# Patient Record
Sex: FEMALE | Race: BLACK OR AFRICAN AMERICAN | Employment: FULL TIME | URBAN - METROPOLITAN AREA
[De-identification: names, ages, dates, MRNs, and addresses within clinical notes are randomized per-mention and may not be internally consistent; named-entity substitution may affect disease eponyms.]

---

## 2017-10-28 ENCOUNTER — HOSPITAL ENCOUNTER (EMERGENCY)
Age: 32
Discharge: HOME | End: 2017-10-28
Payer: SELF-PAY

## 2017-10-28 VITALS
HEART RATE: 78 BPM | BODY MASS INDEX: 50.02 KG/M2 | DIASTOLIC BLOOD PRESSURE: 55 MMHG | SYSTOLIC BLOOD PRESSURE: 116 MMHG | TEMPERATURE: 97.3 F | WEIGHT: 293 LBS | OXYGEN SATURATION: 99 % | HEIGHT: 64 IN | RESPIRATION RATE: 18 BRPM

## 2017-10-28 DIAGNOSIS — K04.7 DENTAL ABSCESS: Primary | ICD-10-CM

## 2017-10-28 PROCEDURE — 74011250636 HC RX REV CODE- 250/636

## 2017-10-28 PROCEDURE — 74011250637 HC RX REV CODE- 250/637

## 2017-10-28 PROCEDURE — 99283 EMERGENCY DEPT VISIT LOW MDM: CPT

## 2017-10-28 PROCEDURE — 96372 THER/PROPH/DIAG INJ SC/IM: CPT

## 2017-10-28 RX ORDER — PROMETHAZINE HYDROCHLORIDE 25 MG/1
25 TABLET ORAL
Status: COMPLETED | OUTPATIENT
Start: 2017-10-28 | End: 2017-10-28

## 2017-10-28 RX ORDER — KETOROLAC TROMETHAMINE 30 MG/ML
60 INJECTION, SOLUTION INTRAMUSCULAR; INTRAVENOUS
Status: COMPLETED | OUTPATIENT
Start: 2017-10-28 | End: 2017-10-28

## 2017-10-28 RX ORDER — HYDROCODONE BITARTRATE AND ACETAMINOPHEN 7.5; 325 MG/1; MG/1
1 TABLET ORAL
Status: COMPLETED | OUTPATIENT
Start: 2017-10-28 | End: 2017-10-28

## 2017-10-28 RX ORDER — TRAMADOL HYDROCHLORIDE 50 MG/1
50 TABLET ORAL
Qty: 12 TAB | Refills: 0 | Status: SHIPPED | OUTPATIENT
Start: 2017-10-28

## 2017-10-28 RX ORDER — CLINDAMYCIN HYDROCHLORIDE 300 MG/1
300 CAPSULE ORAL 4 TIMES DAILY
Qty: 28 CAP | Refills: 0 | Status: SHIPPED | OUTPATIENT
Start: 2017-10-28 | End: 2017-11-04

## 2017-10-28 RX ADMIN — KETOROLAC TROMETHAMINE 60 MG: 30 INJECTION, SOLUTION INTRAMUSCULAR at 03:18

## 2017-10-28 RX ADMIN — PROMETHAZINE HYDROCHLORIDE 25 MG: 25 TABLET ORAL at 03:18

## 2017-10-28 RX ADMIN — HYDROCODONE BITARTRATE AND ACETAMINOPHEN 1 TABLET: 7.5; 325 TABLET ORAL at 03:18

## 2017-10-28 NOTE — ED PROVIDER NOTES
HPI Comments: 80-year-old female complaining of swelling to her right cheek area. Patient has a broken tooth occurred 2 days ago swelling started tonight. Patient is a 28 y.o. female presenting with facial pain. The history is provided by the patient. Facial Pain    The incident occurred 6 to 12 hours ago. The pain is at a severity of 7/10. The pain is moderate. The pain has been constant since the injury. Pertinent negatives include no disorientation. She has tried nothing for the symptoms. No past medical history on file. Past Surgical History:   Procedure Laterality Date    HX HEENT      tubes in ear         No family history on file. Social History     Social History    Marital status: SINGLE     Spouse name: N/A    Number of children: N/A    Years of education: N/A     Occupational History    Not on file. Social History Main Topics    Smoking status: Never Smoker    Smokeless tobacco: Not on file    Alcohol use No    Drug use: No    Sexual activity: Yes     Partners: Male     Birth control/ protection: IUD     Other Topics Concern    Not on file     Social History Narrative         ALLERGIES: Amoxicillin    Review of Systems   Constitutional: Negative. Negative for activity change. HENT: Negative. Eyes: Negative. Respiratory: Negative. Cardiovascular: Negative. Gastrointestinal: Negative. Genitourinary: Negative. Musculoskeletal: Negative. Skin: Negative. Neurological: Negative. Psychiatric/Behavioral: Negative. All other systems reviewed and are negative. Vitals:    10/28/17 0258 10/28/17 0338   BP:  116/55   Pulse: 65 78   Resp: 18 18   Temp: 98.2 °F (36.8 °C) 97.3 °F (36.3 °C)   SpO2: 99% 99%   Weight: 134.3 kg (296 lb)    Height: 5' 4\" (1.626 m)             Physical Exam   Constitutional: She is oriented to person, place, and time. She appears well-developed and well-nourished. No distress. HENT:   Head: Normocephalic and atraumatic. Right Ear: External ear normal.   Left Ear: External ear normal.   Nose: Nose normal.   Mouth/Throat: Oropharynx is clear and moist. Abnormal dentition. Dental abscesses and dental caries present. No oropharyngeal exudate. Eyes: Conjunctivae and EOM are normal. Pupils are equal, round, and reactive to light. Right eye exhibits no discharge. Left eye exhibits no discharge. No scleral icterus. Neck: Normal range of motion. Neck supple. No JVD present. No tracheal deviation present. Cardiovascular: Normal rate, regular rhythm and intact distal pulses. Pulmonary/Chest: Effort normal and breath sounds normal. No stridor. No respiratory distress. She has no wheezes. She exhibits no tenderness. Abdominal: Soft. Bowel sounds are normal. She exhibits no distension and no mass. There is no tenderness. Musculoskeletal: Normal range of motion. She exhibits no edema or tenderness. Neurological: She is alert and oriented to person, place, and time. No cranial nerve deficit. Skin: Skin is warm and dry. No rash noted. She is not diaphoretic. No erythema. No pallor. Psychiatric: She has a normal mood and affect. Her behavior is normal. Thought content normal.   Nursing note and vitals reviewed. MDM  Number of Diagnoses or Management Options  Dental abscess: minor  Diagnosis management comments: Dental abscess and fractured teeth.   Patient encouraged to follow dentist as soon as possible should be given pain medicine and antibiotics for the weekend until she can get in to see a dentist.    ED Course       Procedures

## 2017-10-28 NOTE — ED NOTES
I have reviewed discharge instructions with the patient. The patient verbalized understanding. Patient left ED via Discharge Method: ambulatory to Home with (spouse  Opportunity for questions and clarification provided. Patient given 2 scripts.

## 2017-10-28 NOTE — DISCHARGE INSTRUCTIONS
Abscessed Tooth: Care Instructions  Your Care Instructions    An abscessed tooth is a tooth that has a pocket of pus in the tissues around it. Pus forms when the body tries to fight an infection caused by bacteria. If the pus cannot drain, it forms an abscess. An abscessed tooth can cause red, swollen gums and throbbing pain, especially when you chew. You may have a bad taste in your mouth and a fever, and your jaw may swell. Damage to the tooth, untreated tooth decay, or gum disease can cause an abscessed tooth. An abscessed tooth needs to be treated by a dental professional right away. If it is not treated, the infection could spread to other parts of your body. Your dentist will give you antibiotics to stop the infection. He or she may make a hole in the tooth or cut open (brayan) the abscess inside your mouth so that the infection can drain, which should relieve your pain. You may need to have a root canal treatment, which tries to save your tooth by taking out the infected pulp and replacing it with a healing medicine and/or a filling. If these treatments do not work, your tooth may have to be removed. Follow-up care is a key part of your treatment and safety. Be sure to make and go to all appointments, and call your doctor if you are having problems. It's also a good idea to know your test results and keep a list of the medicines you take. How can you care for yourself at home? · Reduce pain and swelling in your face and jaw by putting ice or a cold pack on the outside of your cheek for 10 to 20 minutes at a time. Put a thin cloth between the ice and your skin. · Take pain medicines exactly as directed. ¨ If the doctor gave you a prescription medicine for pain, take it as prescribed. ¨ If you are not taking a prescription pain medicine, ask your doctor if you can take an over-the-counter medicine. · Take your antibiotics as directed. Do not stop taking them just because you feel better.  You need to take the full course of antibiotics. To prevent tooth abscess  · Brush and floss every day, and have regular dental checkups. · Eat a healthy diet, and avoid sugary foods and drinks. · Do not smoke, use e-cigarettes with nicotine, or use spit tobacco. Tobacco and nicotine slow your ability to heal. Tobacco also increases your risk for gum disease and cancer of the mouth and throat. If you need help quitting, talk to your doctor about stop-smoking programs and medicines. These can increase your chances of quitting for good. When should you call for help? Call 911 anytime you think you may need emergency care. For example, call if:  ? · You have trouble breathing. ?Call your doctor now or seek immediate medical care if:  ? · You have new or worse symptoms of infection, such as:  ¨ Increased pain, swelling, warmth, or redness. ¨ Red streaks leading from the area. ¨ Pus draining from the area. ¨ A fever. ? Watch closely for changes in your health, and be sure to contact your doctor if:  ? · You do not get better as expected. Where can you learn more? Go to http://kellee-jeny.info/. Enter F184 in the search box to learn more about \"Abscessed Tooth: Care Instructions. \"  Current as of: May 12, 2017  Content Version: 11.4  © 0926-2565 [employer]. Care instructions adapted under license by [employer] (which disclaims liability or warranty for this information). If you have questions about a medical condition or this instruction, always ask your healthcare professional. [place] [address] any warranty or liability for your use of this information.

## 2023-03-25 ENCOUNTER — HOSPITAL ENCOUNTER (EMERGENCY)
Age: 38
Discharge: HOME | End: 2023-03-25
Attending: STUDENT IN AN ORGANIZED HEALTH CARE EDUCATION/TRAINING PROGRAM

## 2023-03-25 VITALS
SYSTOLIC BLOOD PRESSURE: 136 MMHG | OXYGEN SATURATION: 99 % | RESPIRATION RATE: 18 BRPM | BODY MASS INDEX: 55.32 KG/M2 | HEART RATE: 80 BPM | TEMPERATURE: 99 F | WEIGHT: 293 LBS | DIASTOLIC BLOOD PRESSURE: 75 MMHG | HEIGHT: 61 IN

## 2023-03-25 DIAGNOSIS — Z77.098 CHEMICAL EXPOSURE: Primary | ICD-10-CM

## 2023-03-25 PROCEDURE — 99282 EMERGENCY DEPT VISIT SF MDM: CPT | Performed by: STUDENT IN AN ORGANIZED HEALTH CARE EDUCATION/TRAINING PROGRAM

## 2023-03-25 ASSESSMENT — LIFESTYLE VARIABLES
HOW MANY STANDARD DRINKS CONTAINING ALCOHOL DO YOU HAVE ON A TYPICAL DAY: 1 OR 2
HOW OFTEN DO YOU HAVE A DRINK CONTAINING ALCOHOL: MONTHLY OR LESS

## 2023-03-26 NOTE — ED TRIAGE NOTES
Pt reports exposure to unknown substance on right hand; local swelling to the hand; rinsed with Em, Dial, and peroxide with no relief

## 2023-03-26 NOTE — ED PROVIDER NOTES
[address]  Emergency Department    ED Course     ED Course as of 03/25/23 2241   Sat Mar 25, 2023   259 69-year-old female no past medical history presents with cute onset of right hand burning sensation after touching paint and other unknown chemicals while moving earlier today. No sensation or motor function deficits on right hand. No appreciable swelling, rash, other defects. Does have superficial what appears to be oil-based paint in the area. Suspect likely chemical reaction exacerbated by underlying anxiety worsened by recent stressors. She does feel better after irrigation. Stable for discharge home. Return given [ER]      ED Course User Index  [ER] Melquiades Vaughn MD     Complexity of Problems Addressed:  1 or more stable acute illness or injury    Data Reviewed and Analyzed:  Category 1:   I ordered each unique test.  I reviewed the results of each unique test.      Category 2:     Category 3: Discussion of management or test interpretation. See ED Course above    HPI   Doretha Mcghee is a 45 y.o. female with a history of none who presents to the ED with complaint of hand pain. Patient states that she has been moving into a new home all day today. This evening while sitting on the couch she had a cute onset of pain to her right hand. Noted a white stain/spots on the palmar aspect of her right hand between her first and second digits. She believes she had touched some paint from moving furniture earlier in the day. Noticed some itching in the webspace with some swelling of her second digit. No rash, erythema. Symptoms not migrate upper arm. She did irrigate and clean the area with soap and water. This did help her symptoms somewhat. Symptoms have improved since coming to the ER. States she felt very anxious about her symptoms after being stressed out about her move and this caused her symptoms to be worse.   Denies rash, tongue/throat swelling, shortness of breath, nausea, vomiting, fevers. No puncture wounds to right hand. Denies numbness, tingling, weakness    ROS   Review of Systems   Skin:  Positive for wound. All other systems reviewed and are negative. History   History reviewed. No pertinent past medical history. History reviewed. No pertinent surgical history. History reviewed. No pertinent family history. Allergies   Allergen Reactions    Latex Swelling       Physical Exam     Vitals:    03/25/23 2157   BP: (!) 158/94   Pulse: 84   Resp: 19   Temp: 99 °F (37.2 °C)   TempSrc: Oral   SpO2: 99%   Weight: 300 lb (136.1 kg)   Height: 5' 1\" (1.549 m)     Nursing note and vitals reviewed. Constitutional: Well developed, NAD  HEENT: Atraumatic, conjugate gaze, EOM intact  Neck: Supple  Cardiovascular: No cyanosis, diaphoresis, or JVD appreciated. Respiratory: Effort normal. No respiratory distress. Gastrointestinal: Bowel sounds present. Non-distended. MSK: No deformities appreciated. No peripheral edema. Skin: Skin is warm and dry. Scattered, superficial white spots consistent with likely oil-based paint on palmar aspect between first and second webspace. Spots do seem to somewhat improved with vigorous rubbing with abrasive 4 x 4. No rash, swelling, sensation deficit, puncture wounds. Radial, ulnar, median nerves intact. 1+ radial pulse  Neuro: Alert and oriented, moves all four extremities. Psych: Pleasant and cooperative. Procedures   Procedures    MDM   Labs Reviewed - No data to display  Medications - No data to display  No orders to display     Voice dictation software was used during the making of this note. This software is not perfect and grammatical and other typographical errors may be present. This note has not been completely proofread for errors.      Hector He MD  03/25/23 7063

## 2023-03-26 NOTE — ED NOTES
I have reviewed discharge instructions with the patient. The patient verbalized understanding. Patient left ED via Discharge Method: ambulatory to Home with family. Opportunity for questions and clarification provided. Patient given 0 scripts. To continue your aftercare when you leave the hospital, you may receive an automated call from our care team to check in on how you are doing. This is a free service and part of our promise to provide the best care and service to meet your aftercare needs.  If you have questions, or wish to unsubscribe from this service please call [phone]. Thank you for Choosing our [facility] Emergency Department.       Veronica Lemus RN  03/25/23 8454

## 2023-03-26 NOTE — DISCHARGE INSTRUCTIONS
Recommend continuing to irrigate and rinse the area with any further symptoms. You may use a special industrial soap to help with oil-based paints on the hand. Return to the ER for any new or worsening symptoms or if any persistent rash or difficulty breathing.

## 2024-09-08 ENCOUNTER — HOSPITAL ENCOUNTER (EMERGENCY)
Age: 39
Discharge: HOME | End: 2024-09-08

## 2024-09-08 VITALS
RESPIRATION RATE: 20 BRPM | HEART RATE: 80 BPM | SYSTOLIC BLOOD PRESSURE: 130 MMHG | HEIGHT: 61 IN | DIASTOLIC BLOOD PRESSURE: 74 MMHG | TEMPERATURE: 98 F | OXYGEN SATURATION: 100 % | BODY MASS INDEX: 55.32 KG/M2 | WEIGHT: 293 LBS

## 2024-09-08 DIAGNOSIS — J02.9 VIRAL PHARYNGITIS: Primary | ICD-10-CM

## 2024-09-08 LAB
FLUAV RNA SPEC QL NAA+PROBE: NOT DETECTED
FLUBV RNA SPEC QL NAA+PROBE: NOT DETECTED
SARS-COV-2 RDRP RESP QL NAA+PROBE: NOT DETECTED
SOURCE: NORMAL
STREP, MOLECULAR: NOT DETECTED

## 2024-09-08 PROCEDURE — 6360000002 HC RX W HCPCS

## 2024-09-08 PROCEDURE — 87651 STREP A DNA AMP PROBE: CPT

## 2024-09-08 PROCEDURE — 87502 INFLUENZA DNA AMP PROBE: CPT

## 2024-09-08 PROCEDURE — 99283 EMERGENCY DEPT VISIT LOW MDM: CPT

## 2024-09-08 PROCEDURE — 87635 SARS-COV-2 COVID-19 AMP PRB: CPT

## 2024-09-08 RX ORDER — PREDNISONE 50 MG/1
50 TABLET ORAL DAILY
Qty: 5 TABLET | Refills: 0 | Status: SHIPPED | OUTPATIENT
Start: 2024-09-08 | End: 2024-09-13

## 2024-09-08 RX ORDER — DEXAMETHASONE SODIUM PHOSPHATE 10 MG/ML
10 INJECTION INTRAMUSCULAR; INTRAVENOUS ONCE
Status: COMPLETED | OUTPATIENT
Start: 2024-09-08 | End: 2024-09-08

## 2024-09-08 RX ORDER — FLUTICASONE PROPIONATE 50 MCG
1 SPRAY, SUSPENSION (ML) NASAL DAILY
Qty: 32 G | Refills: 1 | Status: SHIPPED | OUTPATIENT
Start: 2024-09-08

## 2024-09-08 RX ORDER — CETIRIZINE HYDROCHLORIDE 10 MG/1
10 TABLET ORAL DAILY
Qty: 30 TABLET | Refills: 0 | Status: SHIPPED | OUTPATIENT
Start: 2024-09-08

## 2024-09-08 RX ADMIN — DEXAMETHASONE SODIUM PHOSPHATE 10 MG: 10 INJECTION INTRAMUSCULAR; INTRAVENOUS at 12:27

## 2024-09-08 ASSESSMENT — PAIN DESCRIPTION - LOCATION: LOCATION: THROAT

## 2024-09-08 ASSESSMENT — PAIN - FUNCTIONAL ASSESSMENT
PAIN_FUNCTIONAL_ASSESSMENT: 0-10
PAIN_FUNCTIONAL_ASSESSMENT: NONE - DENIES PAIN

## 2024-09-08 ASSESSMENT — LIFESTYLE VARIABLES
HOW MANY STANDARD DRINKS CONTAINING ALCOHOL DO YOU HAVE ON A TYPICAL DAY: PATIENT DOES NOT DRINK
HOW OFTEN DO YOU HAVE A DRINK CONTAINING ALCOHOL: NEVER

## 2024-09-08 ASSESSMENT — PAIN DESCRIPTION - DESCRIPTORS: DESCRIPTORS: ACHING

## 2024-09-08 ASSESSMENT — PAIN SCALES - GENERAL: PAINLEVEL_OUTOF10: 7

## 2025-03-28 ENCOUNTER — HOSPITAL ENCOUNTER (EMERGENCY)
Age: 40
Discharge: HOME | End: 2025-03-28

## 2025-03-28 VITALS
BODY MASS INDEX: 55.32 KG/M2 | RESPIRATION RATE: 18 BRPM | OXYGEN SATURATION: 100 % | WEIGHT: 293 LBS | SYSTOLIC BLOOD PRESSURE: 137 MMHG | HEIGHT: 61 IN | HEART RATE: 91 BPM | DIASTOLIC BLOOD PRESSURE: 85 MMHG | TEMPERATURE: 98.4 F

## 2025-03-28 DIAGNOSIS — L02.91 ABSCESS: Primary | ICD-10-CM

## 2025-03-28 PROCEDURE — 41800 DRAINAGE OF GUM LESION: CPT

## 2025-03-28 PROCEDURE — 6370000000 HC RX 637 (ALT 250 FOR IP)

## 2025-03-28 PROCEDURE — 6360000002 HC RX W HCPCS

## 2025-03-28 PROCEDURE — 99283 EMERGENCY DEPT VISIT LOW MDM: CPT

## 2025-03-28 RX ORDER — LIDOCAINE HYDROCHLORIDE AND EPINEPHRINE 10; 10 MG/ML; UG/ML
20 INJECTION, SOLUTION INFILTRATION; PERINEURAL ONCE
Status: COMPLETED | OUTPATIENT
Start: 2025-03-28 | End: 2025-03-28

## 2025-03-28 RX ORDER — LIDOCAINE HYDROCHLORIDE 20 MG/ML
15 SOLUTION OROPHARYNGEAL
Status: COMPLETED | OUTPATIENT
Start: 2025-03-28 | End: 2025-03-28

## 2025-03-28 RX ORDER — LIDOCAINE HYDROCHLORIDE 20 MG/ML
15 SOLUTION OROPHARYNGEAL PRN
Qty: 100 ML | Refills: 0 | Status: SHIPPED | OUTPATIENT
Start: 2025-03-28

## 2025-03-28 RX ADMIN — LIDOCAINE HYDROCHLORIDE 15 ML: 20 SOLUTION ORAL at 21:39

## 2025-03-28 RX ADMIN — LIDOCAINE HYDROCHLORIDE,EPINEPHRINE BITARTRATE 20 ML: 10; .01 INJECTION, SOLUTION INFILTRATION; PERINEURAL at 21:40

## 2025-03-28 ASSESSMENT — LIFESTYLE VARIABLES
HOW OFTEN DO YOU HAVE A DRINK CONTAINING ALCOHOL: NEVER
HOW MANY STANDARD DRINKS CONTAINING ALCOHOL DO YOU HAVE ON A TYPICAL DAY: PATIENT DOES NOT DRINK

## 2025-03-28 ASSESSMENT — PAIN SCALES - GENERAL: PAINLEVEL_OUTOF10: 2

## 2025-03-28 ASSESSMENT — PAIN - FUNCTIONAL ASSESSMENT: PAIN_FUNCTIONAL_ASSESSMENT: NONE - DENIES PAIN

## 2025-03-29 ASSESSMENT — ENCOUNTER SYMPTOMS
STRIDOR: 0
WHEEZING: 0
SHORTNESS OF BREATH: 0
FACIAL SWELLING: 0
TROUBLE SWALLOWING: 0
SORE THROAT: 0
VOICE CHANGE: 0

## 2025-03-29 NOTE — ED NOTES
I have reviewed discharge instructions with the patient.  The patient verbalized understanding.    Patient left ED via Discharge Method: ambulatory to Home.    Opportunity for questions and clarification provided.       Patient given 2 scripts.         To continue your aftercare when you leave the hospital, you may receive an automated call from our care team to check in on how you are doing.  This is a free service and part of our promise to provide the best care and service to meet your aftercare needs.” If you have questions, or wish to unsubscribe from this service please call [phone].  Thank you for Choosing our [facility] Emergency Department.

## 2025-03-29 NOTE — ED TRIAGE NOTES
Pt ambulated to triage    Pt states she has an abscess in her mother on the lower left jaw.  Pt was gargling salt water and noticed blood when she spit.  Pt now feels nauseated, and weak.  Pt is concerned it may be infected.

## 2025-03-29 NOTE — DISCHARGE INSTRUCTIONS
As discussed you have an abscess in your mouth.  Please take the antibiotic as prescribed.  You can continue using the lidocaine.  Please continue using the salt water swishes.  Follow-up with your dentist on Monday.  If your symptoms change or worsen please return to the emergency department.

## 2025-03-29 NOTE — ED PROVIDER NOTES
Emergency Department Provider Note       PCP: No primary care provider on file.   Age: 40 y.o.   Sex: female     DISPOSITION Decision To Discharge 03/28/2025 10:23:07 PM    ICD-10-CM    1. Abscess  L02.91           Medical Decision Making     Patient is a 40-year-old female with no relevant past medical history presenting to the emergency department dental abscess.  1 cm area of fluctuation noted at second bicuspid, there is surrounding gingival erythema and inflammation.  Examination does not correlate with Ludwings angina, there is no tenderness to palpation of floor of mouth, tissue is soft to palpation, no submandibular swelling noted.  Low suspicion for epiglottitis, there is no evidence of respiratory distress on physical exam, denies pharyngitis, O2 sat of 100% on physical exam.  Clinical picture does not correlate with peritonsillar abscess, there is no submandibular swelling.  No visualized tonsillar abscess on physical exam.  Patient is nontoxic in appearance, afebrile with O2 sat 100%.  Will perform I&D of abscess.    Patient tolerated the procedure well.  Area prepped in typical sterile manner.  Area anesthetized with lidocaine.  Stab incision made, purulent drainage expressed.  Gauze placed.  Patient tolerated the procedure well, no immediate complications.  No evidence of respiratory distress postprocedural.  Patient does confirm resolution of pain.  Will place patient on course of Augmentin.  She does have a dentist appointment this upcoming Monday.  Instructed patient to have symptom recheck at that appointment.  Discussed supportive treatment with patient.  She was agreeable to treatment plan.  Strict return precautions provided, she verbalized understanding.     1 acute, uncomplicated illness or injury.  Prescription drug management performed.  Shared medical decision making was utilized in creating the patients health plan today.  I independently ordered and reviewed each unique test.

## 2025-05-29 ENCOUNTER — HOSPITAL ENCOUNTER (EMERGENCY)
Age: 40
Discharge: HOME | End: 2025-05-29
Payer: COMMERCIAL

## 2025-05-29 VITALS
OXYGEN SATURATION: 99 % | RESPIRATION RATE: 22 BRPM | WEIGHT: 290 LBS | DIASTOLIC BLOOD PRESSURE: 95 MMHG | SYSTOLIC BLOOD PRESSURE: 146 MMHG | BODY MASS INDEX: 54.75 KG/M2 | TEMPERATURE: 98.8 F | HEART RATE: 110 BPM | HEIGHT: 61 IN

## 2025-05-29 DIAGNOSIS — J02.0 STREP PHARYNGITIS: Primary | ICD-10-CM

## 2025-05-29 LAB
BACTERIA URNS QL MICRO: NEGATIVE /HPF
BILIRUB UR QL: NEGATIVE
EPI CELLS #/AREA URNS HPF: NORMAL /HPF
FLUAV RNA SPEC QL NAA+PROBE: NOT DETECTED
FLUBV RNA SPEC QL NAA+PROBE: NOT DETECTED
GLUCOSE UR QL STRIP.AUTO: NEGATIVE MG/DL
HYALINE CASTS URNS QL MICRO: NORMAL /LPF
KETONES UR-MCNC: NEGATIVE MG/DL
LEUKOCYTE ESTERASE UR QL STRIP: ABNORMAL
NITRITE UR QL: NEGATIVE
PH UR: 6.5 (ref 5–9)
PROT UR QL: NEGATIVE MG/DL
RBC # UR STRIP: NEGATIVE
RBC #/AREA URNS HPF: NORMAL /HPF
SARS-COV-2 RDRP RESP QL NAA+PROBE: NOT DETECTED
SERVICE CMNT-IMP: ABNORMAL
SOURCE: NORMAL
SP GR UR: 1.01 (ref 1–1.02)
STREP, MOLECULAR: DETECTED
UROBILINOGEN UR QL: 0.2 EU/DL (ref 0.2–1)
WBC URNS QL MICRO: NORMAL /HPF

## 2025-05-29 PROCEDURE — 87651 STREP A DNA AMP PROBE: CPT

## 2025-05-29 PROCEDURE — 6370000000 HC RX 637 (ALT 250 FOR IP)

## 2025-05-29 PROCEDURE — 81001 URINALYSIS AUTO W/SCOPE: CPT

## 2025-05-29 PROCEDURE — 87636 SARSCOV2 & INF A&B AMP PRB: CPT

## 2025-05-29 PROCEDURE — 99283 EMERGENCY DEPT VISIT LOW MDM: CPT

## 2025-05-29 RX ORDER — ONDANSETRON 4 MG/1
4 TABLET, ORALLY DISINTEGRATING ORAL 3 TIMES DAILY PRN
Qty: 21 TABLET | Refills: 0 | Status: SHIPPED | OUTPATIENT
Start: 2025-05-29

## 2025-05-29 RX ORDER — AMOXICILLIN 500 MG/1
500 CAPSULE ORAL 2 TIMES DAILY
Qty: 14 CAPSULE | Refills: 0 | Status: SHIPPED | OUTPATIENT
Start: 2025-05-29 | End: 2025-06-05

## 2025-05-29 RX ORDER — ACETAMINOPHEN 500 MG
1000 TABLET ORAL
Status: COMPLETED | OUTPATIENT
Start: 2025-05-29 | End: 2025-05-29

## 2025-05-29 RX ORDER — ONDANSETRON 4 MG/1
4 TABLET, ORALLY DISINTEGRATING ORAL
Status: COMPLETED | OUTPATIENT
Start: 2025-05-29 | End: 2025-05-29

## 2025-05-29 RX ADMIN — ACETAMINOPHEN 1000 MG: 500 TABLET, FILM COATED ORAL at 18:33

## 2025-05-29 RX ADMIN — ONDANSETRON 4 MG: 4 TABLET, ORALLY DISINTEGRATING ORAL at 18:33

## 2025-05-29 ASSESSMENT — ENCOUNTER SYMPTOMS
RHINORRHEA: 1
COUGH: 1
SORE THROAT: 1
ALLERGIC/IMMUNOLOGIC NEGATIVE: 1
GASTROINTESTINAL NEGATIVE: 1
EYES NEGATIVE: 1
SHORTNESS OF BREATH: 0

## 2025-05-29 ASSESSMENT — PAIN SCALES - GENERAL: PAINLEVEL_OUTOF10: 8

## 2025-05-29 NOTE — DISCHARGE INSTRUCTIONS
As we discussed, your workup does show that you do have strep throat.  I will start you on a course of antibiotics.  I will also give you some Zofran.  Please take Tylenol and/or ibuprofen at home for aches, pains, fevers.  I also given information for follow-up with primary care.  As we discussed, please return to emergency department for any new, worsening, concerning symptoms.

## 2025-05-29 NOTE — ED PROVIDER NOTES
Emergency Department Provider Note       [facility] EMERGENCY DEPT   PCP: No primary care provider on file.   Age: 40 y.o.   Sex: female     DISPOSITION Decision To Discharge 05/29/2025 07:32:41 PM    ICD-10-CM    1. Strep pharyngitis  J02.0           Medical Decision Making     In summary,'s is well-appearing nontoxic 4-year-old female coming emergency department for complaints of URI symptoms that have been ongoing for the past couple days.  Strep test positive here.  COVID and flu negative.  Will discharge with course of amoxicillin.  Will give Zofran.  Have instructed over-the-counter medications for pain and fevers.  Have recommended PCP follow-up.  Have given information in discharge paperwork in regards to home care. Findings here today and plan moving forward discussed at length with patient and family which they are in agreement with.  Very strict return precautions were discussed which he verbalized understanding.  Broad differentials were considered during ED course.   ED Course as of 05/29/25 2106   Thu May 29, 2025   1840 Point-of-care urinalysis with trace WBCs.  Will send off urine micro [TC]   1853 Micro negative. [TC]   1932 Strep positive.  COVID and flu negative.  Will discharge. [TC]      ED Course User Index  [TC] Jack Morales APRN - NP     1 acute, uncomplicated illness or injury.  Prescription drug management performed.  Patient was discharged risks and benefits of hospitalization were considered.  Shared medical decision making was utilized in creating the patients health plan today.  I independently ordered and reviewed each unique test.    I reviewed external records: ED visit note from a different ED.   I reviewed external records: provider visit note from PCP.  I reviewed external records: previous imaging study including radiologist interpretation.    history provided by patient.  Patient alert and orient x 4.    I interpreted the swabs.      Exclusion criteria - the patient is NOT to be    Recent Labs     05/29/25  1825   COVID19 Not detected        Voice dictation software was used during the making of this note.  This software is not perfect and grammatical and other typographical errors may be present.  This note has not been completely proofread for errors.       Jack Morales APRN - NP  05/29/25 8017

## 2025-05-30 NOTE — ED NOTES
I have reviewed discharge instructions with the patient.  The patient verbalized understanding.    Patient left ED via Discharge Method: ambulatory to Home with self.    Opportunity for questions and clarification provided.       Patient given 2 scripts.         To continue your aftercare when you leave the hospital, you may receive an automated call from our care team to check in on how you are doing.  This is a free service and part of our promise to provide the best care and service to meet your aftercare needs.” If you have questions, or wish to unsubscribe from this service please call [phone].  Thank you for Choosing our [facility] Emergency Department.